# Patient Record
Sex: MALE | Race: BLACK OR AFRICAN AMERICAN | NOT HISPANIC OR LATINO | Employment: FULL TIME | URBAN - METROPOLITAN AREA
[De-identification: names, ages, dates, MRNs, and addresses within clinical notes are randomized per-mention and may not be internally consistent; named-entity substitution may affect disease eponyms.]

---

## 2017-11-08 ENCOUNTER — OFFICE VISIT (OUTPATIENT)
Dept: FAMILY MEDICINE | Facility: CLINIC | Age: 27
End: 2017-11-08
Payer: COMMERCIAL

## 2017-11-08 VITALS
BODY MASS INDEX: 27.06 KG/M2 | TEMPERATURE: 98 F | OXYGEN SATURATION: 99 % | WEIGHT: 189 LBS | HEART RATE: 64 BPM | HEIGHT: 70 IN | SYSTOLIC BLOOD PRESSURE: 108 MMHG | DIASTOLIC BLOOD PRESSURE: 68 MMHG

## 2017-11-08 DIAGNOSIS — H61.22 IMPACTED CERUMEN OF LEFT EAR: Primary | ICD-10-CM

## 2017-11-08 PROCEDURE — 99203 OFFICE O/P NEW LOW 30 MIN: CPT | Mod: S$GLB,,, | Performed by: NURSE PRACTITIONER

## 2017-11-08 NOTE — PROGRESS NOTES
Subjective:       Patient ID: Garett Mohr is a 27 y.o. male.    Chief Complaint: Ear Fullness    Ear Fullness    There is pain in the left ear. This is a new problem. The current episode started in the past 7 days. The problem occurs constantly. The problem has been unchanged. There has been no fever. The pain is at a severity of 0/10. The patient is experiencing no pain. Pertinent negatives include no abdominal pain, coughing, diarrhea, ear discharge, headaches, hearing loss, neck pain, rash, rhinorrhea, sore throat or vomiting. He has tried nothing for the symptoms. There is no history of a chronic ear infection, hearing loss or a tympanostomy tube.     Review of Systems   Constitutional: Negative for chills, diaphoresis, fatigue and fever.   HENT: Negative for ear discharge, hearing loss, rhinorrhea and sore throat.         Left ear fullness   Eyes: Negative for photophobia and visual disturbance.   Respiratory: Negative for cough, chest tightness and wheezing.    Gastrointestinal: Negative for abdominal pain, diarrhea and vomiting.   Musculoskeletal: Negative for neck pain.   Skin: Negative for rash.   Neurological: Negative for headaches.       Objective:      Physical Exam   Constitutional: He is oriented to person, place, and time. He appears well-developed and well-nourished. No distress.   HENT:   Right Ear: External ear normal.   Left Ear: External ear normal.   Left ear cerumen impaction     Cardiovascular: Normal rate, regular rhythm and normal heart sounds.    Pulmonary/Chest: Effort normal and breath sounds normal.   Neurological: He is alert and oriented to person, place, and time.   Skin: Skin is warm and dry. He is not diaphoretic.   Psychiatric: He has a normal mood and affect. His behavior is normal. Judgment and thought content normal.   Vitals reviewed.      Assessment:       1. Impacted cerumen of left ear        Plan:       Impacted cerumen of left ear      Left ear soaked and flushed  cerumen removed pt tolerated well

## 2019-04-28 ENCOUNTER — HOSPITAL ENCOUNTER (EMERGENCY)
Facility: HOSPITAL | Age: 29
Discharge: HOME | End: 2019-04-28
Attending: EMERGENCY MEDICINE
Payer: COMMERCIAL

## 2019-04-28 VITALS
SYSTOLIC BLOOD PRESSURE: 138 MMHG | OXYGEN SATURATION: 97 % | HEIGHT: 69 IN | TEMPERATURE: 98 F | BODY MASS INDEX: 28.14 KG/M2 | WEIGHT: 190 LBS | HEART RATE: 74 BPM | RESPIRATION RATE: 20 BRPM | DIASTOLIC BLOOD PRESSURE: 91 MMHG

## 2019-04-28 DIAGNOSIS — S91.209A TOENAIL AVULSION, INITIAL ENCOUNTER: Primary | ICD-10-CM

## 2019-04-28 PROCEDURE — 99284 EMERGENCY DEPT VISIT MOD MDM: CPT | Mod: ER

## 2019-04-28 PROCEDURE — 96372 THER/PROPH/DIAG INJ SC/IM: CPT | Mod: ER

## 2019-04-28 PROCEDURE — 90715 TDAP VACCINE 7 YRS/> IM: CPT | Mod: ER | Performed by: PHYSICIAN ASSISTANT

## 2019-04-28 PROCEDURE — 63600175 PHARM REV CODE 636 W HCPCS: Mod: ER | Performed by: PHYSICIAN ASSISTANT

## 2019-04-28 PROCEDURE — 90471 IMMUNIZATION ADMIN: CPT | Mod: ER | Performed by: PHYSICIAN ASSISTANT

## 2019-04-28 PROCEDURE — 25000003 PHARM REV CODE 250: Mod: ER | Performed by: PHYSICIAN ASSISTANT

## 2019-04-28 RX ORDER — KETOROLAC TROMETHAMINE 30 MG/ML
30 INJECTION, SOLUTION INTRAMUSCULAR; INTRAVENOUS
Status: COMPLETED | OUTPATIENT
Start: 2019-04-28 | End: 2019-04-28

## 2019-04-28 RX ORDER — CEPHALEXIN 250 MG/1
250 CAPSULE ORAL 4 TIMES DAILY
Qty: 28 CAPSULE | Refills: 0 | Status: SHIPPED | OUTPATIENT
Start: 2019-04-28 | End: 2019-05-05

## 2019-04-28 RX ORDER — KETOROLAC TROMETHAMINE 10 MG/1
10 TABLET, FILM COATED ORAL EVERY 6 HOURS
Qty: 10 TABLET | Refills: 0 | Status: SHIPPED | OUTPATIENT
Start: 2019-04-28 | End: 2021-07-30

## 2019-04-28 RX ORDER — CEPHALEXIN 500 MG/1
500 CAPSULE ORAL
Status: COMPLETED | OUTPATIENT
Start: 2019-04-28 | End: 2019-04-28

## 2019-04-28 RX ADMIN — CLOSTRIDIUM TETANI TOXOID ANTIGEN (FORMALDEHYDE INACTIVATED), CORYNEBACTERIUM DIPHTHERIAE TOXOID ANTIGEN (FORMALDEHYDE INACTIVATED), BORDETELLA PERTUSSIS TOXOID ANTIGEN (GLUTARALDEHYDE INACTIVATED), BORDETELLA PERTUSSIS FILAMENTOUS HEMAGGLUTININ ANTIGEN (FORMALDEHYDE INACTIVATED), BORDETELLA PERTUSSIS PERTACTIN ANTIGEN, AND BORDETELLA PERTUSSIS FIMBRIAE 2/3 ANTIGEN 0.5 ML: 5; 2; 2.5; 5; 3; 5 INJECTION, SUSPENSION INTRAMUSCULAR at 09:04

## 2019-04-28 RX ADMIN — BACITRACIN, NEOMYCIN, POLYMYXIN B 1 EACH: 400; 3.5; 5 OINTMENT TOPICAL at 09:04

## 2019-04-28 RX ADMIN — CEPHALEXIN 500 MG: 500 CAPSULE ORAL at 10:04

## 2019-04-28 RX ADMIN — KETOROLAC TROMETHAMINE 30 MG: 30 INJECTION, SOLUTION INTRAMUSCULAR at 09:04

## 2019-04-29 NOTE — ED PROVIDER NOTES
Encounter Date: 4/28/2019       History     Chief Complaint   Patient presents with    Toe Pain     PT right toe nail was lifting and patient was playing basketball this evening and someone stepped on toe nail and pt wants to get toe nail removed. Mild swelling and redness to right great toe, No bleeding.      28-year-old male presents to the emergency department for evaluation of right great toenail avulsion status post injury. He reports that he was playing basketball this evening when another player accidentally stepped on his foot, tearing the top part of his nail.  He reports that the toenail is only attached by one side.  He reports denies any bleeding when the injury occurred.  He denies any numbness, tingling, weakness or swelling to the lower extremities.  He reports a achy, throbbing pain in his toe without radiation to the foot, ankle or knee.  No treatment was attempted prior to arrival.  He is unsure of the date of his last tetanus immunization.        Review of patient's allergies indicates:  No Known Allergies  History reviewed. No pertinent past medical history.  History reviewed. No pertinent surgical history.  History reviewed. No pertinent family history.  Social History     Tobacco Use    Smoking status: Never Smoker   Substance Use Topics    Alcohol use: No    Drug use: No     Review of Systems   Constitutional: Negative for activity change, appetite change and fever.   HENT: Negative for congestion, ear pain, facial swelling, rhinorrhea, sore throat, trouble swallowing and voice change.    Eyes: Negative for photophobia and visual disturbance.   Respiratory: Negative for cough, chest tightness, shortness of breath and wheezing.    Cardiovascular: Negative for chest pain.   Gastrointestinal: Negative for abdominal pain, constipation, diarrhea, nausea and vomiting.   Genitourinary: Negative for decreased urine volume and dysuria.   Musculoskeletal: Negative for back pain, joint swelling, neck  pain and neck stiffness.   Skin: Positive for wound. Negative for rash.   Neurological: Negative for dizziness, syncope, weakness, light-headedness, numbness and headaches.   Hematological: Does not bruise/bleed easily.       Physical Exam     Initial Vitals [04/28/19 2135]   BP Pulse Resp Temp SpO2   (!) 138/91 74 20 98.3 °F (36.8 °C) 97 %      MAP       --         Physical Exam    Nursing note and vitals reviewed.  Constitutional: He appears well-developed and well-nourished. He is not diaphoretic. No distress.   HENT:   Head: Normocephalic and atraumatic.   Right Ear: External ear normal.   Left Ear: External ear normal.   Mouth/Throat: Oropharynx is clear and moist. No oropharyngeal exudate.   Eyes: Conjunctivae and EOM are normal. Pupils are equal, round, and reactive to light.   Neck: Normal range of motion. Neck supple.   Cardiovascular: Normal rate, regular rhythm and normal heart sounds.   Pulmonary/Chest: Breath sounds normal. No respiratory distress. He has no wheezes. He has no rhonchi. He has no rales. He exhibits no tenderness.   Abdominal: Soft. Bowel sounds are normal. He exhibits no distension. There is no tenderness. There is no guarding.   Musculoskeletal:        Right knee: He exhibits normal range of motion, no swelling and no effusion. No tenderness found.        Right ankle: He exhibits normal range of motion, no swelling, no ecchymosis and no deformity. No tenderness.        Right lower leg: He exhibits no tenderness, no bony tenderness and no swelling.        Right foot: There is tenderness. There is normal range of motion, no bony tenderness and no swelling.        Feet:    Lymphadenopathy:     He has no cervical adenopathy.   Neurological: He is alert and oriented to person, place, and time.   Skin: Skin is warm and dry.   Psychiatric: He has a normal mood and affect.         ED Course   Procedures  Labs Reviewed - No data to display       Imaging Results    None          Medical Decision  Making:   Initial Assessment:   28-year-old male presents for evaluation of partial toenail avulsion.  Physical exam reveals a nontoxic-appearing male in no acute distress. Patient is afebrile vital signs within normal limits.  Neurological exam reveals an alert and oriented patient.  No evidence of head injury noted. No tenderness to palpation noted over the paraspinal musculature of the spinous processes of the cervical, thoracic or lumbar spine.  Lungs clear to auscultation bilaterally. No respiratory distress or accessory muscle use noted.  Examination of the right lower extremity reveals Partial nail avulsion of the top 2/3 of the right great toenail.  No active bleeding noted. The avulsed toenail is still attached to the medial side of the nail bed.  Full range of motion, sensation and peripheral pulses intact in lower extremities bilaterally.  Differential Diagnosis:   Partial nail avulsion  Offered an x-ray to assess for possible osseous injury including fracture dislocation, patient declined at this time stating he does not believe it to be fractured.  ED Management:  Patient's tetanus immunization is brought up-to-date.  Will cover the patient with Keflex upon discharge. Instructed the patient to follow up with his primary care provider for re-evaluation and to return to the emergency department immediately for any new or worsening symptoms.                      Clinical Impression:       ICD-10-CM ICD-9-CM   1. Toenail avulsion, initial encounter S91.209A 893.0                                Tata Gamble PA-C  04/29/19 0006

## 2019-04-29 NOTE — DISCHARGE INSTRUCTIONS
You are advised to follow up with your primary care provider for re-evaluation within 3 days.  You are instructed to return to the emergency department immediately for any new or worsening symptoms

## 2021-07-30 ENCOUNTER — OFFICE VISIT (OUTPATIENT)
Dept: FAMILY MEDICINE | Facility: CLINIC | Age: 31
End: 2021-07-30
Payer: COMMERCIAL

## 2021-07-30 ENCOUNTER — LAB VISIT (OUTPATIENT)
Dept: LAB | Facility: HOSPITAL | Age: 31
End: 2021-07-30
Attending: STUDENT IN AN ORGANIZED HEALTH CARE EDUCATION/TRAINING PROGRAM
Payer: COMMERCIAL

## 2021-07-30 VITALS
SYSTOLIC BLOOD PRESSURE: 122 MMHG | BODY MASS INDEX: 31.17 KG/M2 | TEMPERATURE: 98 F | WEIGHT: 210.44 LBS | OXYGEN SATURATION: 98 % | HEART RATE: 67 BPM | DIASTOLIC BLOOD PRESSURE: 78 MMHG | HEIGHT: 69 IN

## 2021-07-30 DIAGNOSIS — R73.9 HYPERGLYCEMIA: ICD-10-CM

## 2021-07-30 DIAGNOSIS — Z00.00 WELLNESS EXAMINATION: ICD-10-CM

## 2021-07-30 DIAGNOSIS — Z00.00 WELLNESS EXAMINATION: Primary | ICD-10-CM

## 2021-07-30 DIAGNOSIS — Z13.6 SCREENING FOR CARDIOVASCULAR CONDITION: ICD-10-CM

## 2021-07-30 LAB
ALBUMIN SERPL BCP-MCNC: 4.6 G/DL (ref 3.5–5.2)
ALP SERPL-CCNC: 73 U/L (ref 38–126)
ALT SERPL W/O P-5'-P-CCNC: 26 U/L (ref 10–44)
ANION GAP SERPL CALC-SCNC: 9 MMOL/L (ref 8–16)
AST SERPL-CCNC: 42 U/L (ref 15–46)
BASOPHILS # BLD AUTO: 0.03 K/UL (ref 0–0.2)
BASOPHILS NFR BLD: 0.9 % (ref 0–1.9)
BILIRUB SERPL-MCNC: 0.6 MG/DL (ref 0.1–1)
CALCIUM SERPL-MCNC: 9.3 MG/DL (ref 8.7–10.5)
CHLORIDE SERPL-SCNC: 102 MMOL/L (ref 95–110)
CHOLEST SERPL-MCNC: 146 MG/DL (ref 120–199)
CHOLEST/HDLC SERPL: 3.2 {RATIO} (ref 2–5)
CO2 SERPL-SCNC: 30 MMOL/L (ref 23–29)
CREAT SERPL-MCNC: 1.22 MG/DL (ref 0.5–1.4)
DIFFERENTIAL METHOD: ABNORMAL
EOSINOPHIL # BLD AUTO: 0 K/UL (ref 0–0.5)
EOSINOPHIL NFR BLD: 1.2 % (ref 0–8)
ERYTHROCYTE [DISTWIDTH] IN BLOOD BY AUTOMATED COUNT: 13.5 % (ref 11.5–14.5)
EST. GFR  (AFRICAN AMERICAN): >60 ML/MIN/1.73 M^2
EST. GFR  (NON AFRICAN AMERICAN): >60 ML/MIN/1.73 M^2
ESTIMATED AVG GLUCOSE: 111 MG/DL (ref 68–131)
GLUCOSE SERPL-MCNC: 84 MG/DL (ref 70–110)
HBA1C MFR BLD: 5.5 % (ref 4–5.6)
HCT VFR BLD AUTO: 46.2 % (ref 40–54)
HDLC SERPL-MCNC: 45 MG/DL (ref 40–75)
HDLC SERPL: 30.8 % (ref 20–50)
HGB BLD-MCNC: 15.3 G/DL (ref 14–18)
IMM GRANULOCYTES # BLD AUTO: 0 K/UL (ref 0–0.04)
IMM GRANULOCYTES NFR BLD AUTO: 0 % (ref 0–0.5)
LDLC SERPL CALC-MCNC: 92.2 MG/DL (ref 63–159)
LYMPHOCYTES # BLD AUTO: 1.9 K/UL (ref 1–4.8)
LYMPHOCYTES NFR BLD: 60.4 % (ref 18–48)
MCH RBC QN AUTO: 29.4 PG (ref 27–31)
MCHC RBC AUTO-ENTMCNC: 33.1 G/DL (ref 32–36)
MCV RBC AUTO: 89 FL (ref 82–98)
MONOCYTES # BLD AUTO: 0.4 K/UL (ref 0.3–1)
MONOCYTES NFR BLD: 11.2 % (ref 4–15)
NEUTROPHILS # BLD AUTO: 0.8 K/UL (ref 1.8–7.7)
NEUTROPHILS NFR BLD: 26.3 % (ref 38–73)
NONHDLC SERPL-MCNC: 101 MG/DL
NRBC BLD-RTO: 0 /100 WBC
PLATELET # BLD AUTO: 207 K/UL (ref 150–450)
PMV BLD AUTO: 12.7 FL (ref 9.2–12.9)
POTASSIUM SERPL-SCNC: 4.3 MMOL/L (ref 3.5–5.1)
PROT SERPL-MCNC: 7.5 G/DL (ref 6–8.4)
RBC # BLD AUTO: 5.2 M/UL (ref 4.6–6.2)
SODIUM SERPL-SCNC: 141 MMOL/L (ref 136–145)
TRIGL SERPL-MCNC: 44 MG/DL (ref 30–150)
TSH SERPL DL<=0.005 MIU/L-ACNC: 0.74 UIU/ML (ref 0.4–4)
UUN UR-MCNC: 17 MG/DL (ref 2–20)
WBC # BLD AUTO: 3.21 K/UL (ref 3.9–12.7)

## 2021-07-30 PROCEDURE — 36415 COLL VENOUS BLD VENIPUNCTURE: CPT | Mod: PO | Performed by: STUDENT IN AN ORGANIZED HEALTH CARE EDUCATION/TRAINING PROGRAM

## 2021-07-30 PROCEDURE — 83036 HEMOGLOBIN GLYCOSYLATED A1C: CPT | Performed by: STUDENT IN AN ORGANIZED HEALTH CARE EDUCATION/TRAINING PROGRAM

## 2021-07-30 PROCEDURE — 80053 COMPREHEN METABOLIC PANEL: CPT | Mod: PO | Performed by: STUDENT IN AN ORGANIZED HEALTH CARE EDUCATION/TRAINING PROGRAM

## 2021-07-30 PROCEDURE — 84443 ASSAY THYROID STIM HORMONE: CPT | Mod: PO | Performed by: STUDENT IN AN ORGANIZED HEALTH CARE EDUCATION/TRAINING PROGRAM

## 2021-07-30 PROCEDURE — 99385 PREV VISIT NEW AGE 18-39: CPT | Mod: S$GLB,,, | Performed by: STUDENT IN AN ORGANIZED HEALTH CARE EDUCATION/TRAINING PROGRAM

## 2021-07-30 PROCEDURE — 80061 LIPID PANEL: CPT | Performed by: STUDENT IN AN ORGANIZED HEALTH CARE EDUCATION/TRAINING PROGRAM

## 2021-07-30 PROCEDURE — 99385 PR PREVENTIVE VISIT,NEW,18-39: ICD-10-PCS | Mod: S$GLB,,, | Performed by: STUDENT IN AN ORGANIZED HEALTH CARE EDUCATION/TRAINING PROGRAM

## 2021-07-30 PROCEDURE — 85025 COMPLETE CBC W/AUTO DIFF WBC: CPT | Mod: PO | Performed by: STUDENT IN AN ORGANIZED HEALTH CARE EDUCATION/TRAINING PROGRAM

## 2021-08-03 ENCOUNTER — TELEPHONE (OUTPATIENT)
Dept: FAMILY MEDICINE | Facility: CLINIC | Age: 31
End: 2021-08-03

## 2022-09-23 ENCOUNTER — OFFICE VISIT (OUTPATIENT)
Dept: FAMILY MEDICINE | Facility: CLINIC | Age: 32
End: 2022-09-23
Payer: COMMERCIAL

## 2022-09-23 VITALS
HEIGHT: 69 IN | DIASTOLIC BLOOD PRESSURE: 76 MMHG | WEIGHT: 189.94 LBS | OXYGEN SATURATION: 99 % | TEMPERATURE: 98 F | HEART RATE: 70 BPM | BODY MASS INDEX: 28.13 KG/M2 | SYSTOLIC BLOOD PRESSURE: 120 MMHG

## 2022-09-23 DIAGNOSIS — Z00.00 PREVENTATIVE HEALTH CARE: Primary | ICD-10-CM

## 2022-09-23 DIAGNOSIS — Z00.00 WELLNESS EXAMINATION: ICD-10-CM

## 2022-09-23 PROCEDURE — 99395 PREV VISIT EST AGE 18-39: CPT | Mod: S$GLB,,, | Performed by: STUDENT IN AN ORGANIZED HEALTH CARE EDUCATION/TRAINING PROGRAM

## 2022-09-23 PROCEDURE — 99395 PR PREVENTIVE VISIT,EST,18-39: ICD-10-PCS | Mod: S$GLB,,, | Performed by: STUDENT IN AN ORGANIZED HEALTH CARE EDUCATION/TRAINING PROGRAM

## 2022-09-23 NOTE — PROGRESS NOTES
" Patient ID: Garett Mohr is a 32 y.o. male.     Chief Complaint: Annual Exam    HPI   Patient here for annual exam. He has no complaints today. He is feeling well. He denies chest pain and shortness of breath. He denies bowel and urinary problems.     Review of Systems  Review of Systems   Constitutional:  Negative for fever.   HENT:  Negative for ear pain and sinus pain.    Eyes:  Negative for discharge.   Respiratory:  Negative for cough and shortness of breath.    Cardiovascular:  Negative for chest pain and leg swelling.   Gastrointestinal:  Negative for diarrhea, nausea and vomiting.   Genitourinary:  Negative for urgency.   Musculoskeletal:  Negative for myalgias.   Skin:  Negative for rash.   Neurological:  Negative for weakness and headaches.   Psychiatric/Behavioral:  Negative for depression.    All other systems reviewed and are negative.    Currently Medications  No current outpatient medications on file prior to visit.     No current facility-administered medications on file prior to visit.       Physical  Exam  Vitals:    09/23/22 1102   BP: 120/76   BP Location: Left arm   Patient Position: Sitting   Pulse: 70   Temp: 97.6 °F (36.4 °C)   SpO2: 99%   Weight: 86.1 kg (189 lb 14.8 oz)   Height: 5' 9" (1.753 m)      Body mass index is 28.05 kg/m².    Physical Exam  Vitals and nursing note reviewed.   Constitutional:       General: He is not in acute distress.     Appearance: He is not ill-appearing.   HENT:      Head: Normocephalic and atraumatic.      Right Ear: External ear normal.      Left Ear: External ear normal.      Nose: Nose normal.      Mouth/Throat:      Mouth: Mucous membranes are moist.   Eyes:      Extraocular Movements: Extraocular movements intact.      Conjunctiva/sclera: Conjunctivae normal.   Cardiovascular:      Rate and Rhythm: Normal rate and regular rhythm.      Pulses: Normal pulses.      Heart sounds: No murmur heard.  Pulmonary:      Effort: Pulmonary effort is normal. No " respiratory distress.      Breath sounds: No wheezing.   Abdominal:      General: There is no distension.      Palpations: Abdomen is soft. There is no mass.      Tenderness: There is no abdominal tenderness.   Musculoskeletal:         General: No swelling.      Cervical back: Normal range of motion.   Skin:     Coloration: Skin is not jaundiced.      Findings: No rash.   Neurological:      General: No focal deficit present.      Mental Status: He is alert and oriented to person, place, and time.   Psychiatric:         Mood and Affect: Mood normal.         Thought Content: Thought content normal.       Labs:    Complete Blood Count  Lab Results   Component Value Date    RBC 5.20 07/30/2021    HGB 15.3 07/30/2021    HCT 46.2 07/30/2021    MCV 89 07/30/2021    MCH 29.4 07/30/2021    MCHC 33.1 07/30/2021    RDW 13.5 07/30/2021     07/30/2021    MPV 12.7 07/30/2021    GRAN 0.8 (L) 07/30/2021    GRAN 26.3 (L) 07/30/2021    LYMPH 1.9 07/30/2021    LYMPH 60.4 (H) 07/30/2021    MONO 0.4 07/30/2021    MONO 11.2 07/30/2021    EOS 0.0 07/30/2021    BASO 0.03 07/30/2021    EOSINOPHIL 1.2 07/30/2021    BASOPHIL 0.9 07/30/2021    DIFFMETHOD Automated 07/30/2021       Comprehensive Metabolic Panel  No results found for: GLU, BUN, CREATININE, NA, K, CL, PROT, ALBUMIN, BILITOT, AST, ALKPHOS, CO2, ALT, ANIONGAP, EGFRNONAA, ESTGFRAFRICA    TSH  No results found for: TSH    Imaging:  No image results found.      Assessment/Plan:    Problem List Items Addressed This Visit    None  Visit Diagnoses       Preventative health care    -  Primary    Relevant Orders    CBC Auto Differential    Comprehensive metabolic panel    HEMOGLOBIN A1C    LIPID PANEL    TSH             Discussed how to stay healthy including: diet, exercise, refraining from smoking and discussed screening exams / tests needed for age, sex and family Hx.        Dustin Flanagan MD

## 2022-09-27 ENCOUNTER — LAB VISIT (OUTPATIENT)
Dept: LAB | Facility: HOSPITAL | Age: 32
End: 2022-09-27
Attending: STUDENT IN AN ORGANIZED HEALTH CARE EDUCATION/TRAINING PROGRAM
Payer: COMMERCIAL

## 2022-09-27 DIAGNOSIS — Z00.00 PREVENTATIVE HEALTH CARE: ICD-10-CM

## 2022-09-27 LAB
ALBUMIN SERPL BCP-MCNC: 4.7 G/DL (ref 3.5–5.2)
ALP SERPL-CCNC: 82 U/L (ref 38–126)
ALT SERPL W/O P-5'-P-CCNC: 44 U/L (ref 10–44)
ANION GAP SERPL CALC-SCNC: 9 MMOL/L (ref 8–16)
AST SERPL-CCNC: 61 U/L (ref 15–46)
BASOPHILS # BLD AUTO: 0.07 K/UL (ref 0–0.2)
BASOPHILS NFR BLD: 1.3 % (ref 0–1.9)
BILIRUB SERPL-MCNC: 0.9 MG/DL (ref 0.1–1)
CALCIUM SERPL-MCNC: 9.1 MG/DL (ref 8.7–10.5)
CHLORIDE SERPL-SCNC: 105 MMOL/L (ref 95–110)
CHOLEST SERPL-MCNC: 151 MG/DL (ref 120–199)
CHOLEST/HDLC SERPL: 2.8 {RATIO} (ref 2–5)
CO2 SERPL-SCNC: 27 MMOL/L (ref 23–29)
CREAT SERPL-MCNC: 1.13 MG/DL (ref 0.5–1.4)
DIFFERENTIAL METHOD: NORMAL
EOSINOPHIL # BLD AUTO: 0.1 K/UL (ref 0–0.5)
EOSINOPHIL NFR BLD: 1.7 % (ref 0–8)
ERYTHROCYTE [DISTWIDTH] IN BLOOD BY AUTOMATED COUNT: 13.6 % (ref 11.5–14.5)
EST. GFR  (NO RACE VARIABLE): >60 ML/MIN/1.73 M^2
ESTIMATED AVG GLUCOSE: 108 MG/DL (ref 68–131)
GLUCOSE SERPL-MCNC: 87 MG/DL (ref 70–110)
HBA1C MFR BLD: 5.4 % (ref 4–5.6)
HCT VFR BLD AUTO: 43.4 % (ref 40–54)
HDLC SERPL-MCNC: 54 MG/DL (ref 40–75)
HDLC SERPL: 35.8 % (ref 20–50)
HGB BLD-MCNC: 14.6 G/DL (ref 14–18)
IMM GRANULOCYTES # BLD AUTO: 0 K/UL (ref 0–0.04)
IMM GRANULOCYTES NFR BLD AUTO: 0 % (ref 0–0.5)
LDLC SERPL CALC-MCNC: 88 MG/DL (ref 63–159)
LYMPHOCYTES # BLD AUTO: 2.5 K/UL (ref 1–4.8)
LYMPHOCYTES NFR BLD: 46.4 % (ref 18–48)
MCH RBC QN AUTO: 30 PG (ref 27–31)
MCHC RBC AUTO-ENTMCNC: 33.6 G/DL (ref 32–36)
MCV RBC AUTO: 89 FL (ref 82–98)
MONOCYTES # BLD AUTO: 0.4 K/UL (ref 0.3–1)
MONOCYTES NFR BLD: 7 % (ref 4–15)
NEUTROPHILS # BLD AUTO: 2.3 K/UL (ref 1.8–7.7)
NEUTROPHILS NFR BLD: 43.6 % (ref 38–73)
NONHDLC SERPL-MCNC: 97 MG/DL
NRBC BLD-RTO: 0 /100 WBC
PLATELET # BLD AUTO: 268 K/UL (ref 150–450)
PMV BLD AUTO: 11.6 FL (ref 9.2–12.9)
POTASSIUM SERPL-SCNC: 4.9 MMOL/L (ref 3.5–5.1)
PROT SERPL-MCNC: 7.7 G/DL (ref 6–8.4)
RBC # BLD AUTO: 4.86 M/UL (ref 4.6–6.2)
SODIUM SERPL-SCNC: 141 MMOL/L (ref 136–145)
TRIGL SERPL-MCNC: 45 MG/DL (ref 30–150)
TSH SERPL DL<=0.005 MIU/L-ACNC: 0.68 UIU/ML (ref 0.4–4)
UUN UR-MCNC: 14 MG/DL (ref 2–20)
WBC # BLD AUTO: 5.3 K/UL (ref 3.9–12.7)

## 2022-09-27 PROCEDURE — 80061 LIPID PANEL: CPT | Performed by: STUDENT IN AN ORGANIZED HEALTH CARE EDUCATION/TRAINING PROGRAM

## 2022-09-27 PROCEDURE — 80053 COMPREHEN METABOLIC PANEL: CPT | Mod: PO | Performed by: STUDENT IN AN ORGANIZED HEALTH CARE EDUCATION/TRAINING PROGRAM

## 2022-09-27 PROCEDURE — 84443 ASSAY THYROID STIM HORMONE: CPT | Mod: PO | Performed by: STUDENT IN AN ORGANIZED HEALTH CARE EDUCATION/TRAINING PROGRAM

## 2022-09-27 PROCEDURE — 83036 HEMOGLOBIN GLYCOSYLATED A1C: CPT | Performed by: STUDENT IN AN ORGANIZED HEALTH CARE EDUCATION/TRAINING PROGRAM

## 2022-09-27 PROCEDURE — 85025 COMPLETE CBC W/AUTO DIFF WBC: CPT | Mod: PO | Performed by: STUDENT IN AN ORGANIZED HEALTH CARE EDUCATION/TRAINING PROGRAM

## 2022-09-27 PROCEDURE — 36415 COLL VENOUS BLD VENIPUNCTURE: CPT | Mod: PO | Performed by: STUDENT IN AN ORGANIZED HEALTH CARE EDUCATION/TRAINING PROGRAM

## 2023-05-04 ENCOUNTER — PATIENT MESSAGE (OUTPATIENT)
Dept: FAMILY MEDICINE | Facility: CLINIC | Age: 33
End: 2023-05-04
Payer: COMMERCIAL

## 2023-05-05 ENCOUNTER — PATIENT MESSAGE (OUTPATIENT)
Dept: FAMILY MEDICINE | Facility: CLINIC | Age: 33
End: 2023-05-05
Payer: COMMERCIAL

## 2023-05-05 RX ORDER — AZITHROMYCIN 250 MG/1
TABLET, FILM COATED ORAL
Qty: 6 TABLET | Refills: 0 | Status: SHIPPED | OUTPATIENT
Start: 2023-05-05 | End: 2023-05-10

## 2023-06-29 ENCOUNTER — TELEPHONE (OUTPATIENT)
Dept: ADMINISTRATIVE | Facility: OTHER | Age: 33
End: 2023-06-29
Payer: COMMERCIAL

## 2023-06-29 NOTE — TELEPHONE ENCOUNTER
----- Message from Danie Fair sent at 6/29/2023  8:33 AM CDT -----  Contact: pt wife    Type:  Sooner Apoointment Request    Caller is requesting a sooner appointment.  Caller declined first available appointment listed below.  Caller will not accept being placed on the waitlist and is requesting a message be sent to doctor.  Name of Caller:pt   When is the first available appointment?pt wife   Symptoms:rash/hives/skin irritation   Would the patient rather a call back or a response via [facility]? call  Best Call Back Number:[phone]  Additional Information: Spoke to pt's wife. Wife states pt has a rash with whelps (legs,back,sides)- no burning or pain but a lot of discomfort and itching. Symptoms have been going on for 1 week, pt has been taking Benadryl since it's the only thing helping with itching. Wife states sypmptoms may be due to stress/anxiety, pt has had this breakout before years ago. Wants appt for today or tomorrow.    Pharmacy- [facility]

## 2023-06-30 ENCOUNTER — OFFICE VISIT (OUTPATIENT)
Dept: FAMILY MEDICINE | Facility: CLINIC | Age: 33
End: 2023-06-30
Payer: COMMERCIAL

## 2023-06-30 VITALS
OXYGEN SATURATION: 99 % | HEIGHT: 69 IN | SYSTOLIC BLOOD PRESSURE: 126 MMHG | BODY MASS INDEX: 29.33 KG/M2 | TEMPERATURE: 98 F | HEART RATE: 74 BPM | DIASTOLIC BLOOD PRESSURE: 88 MMHG | WEIGHT: 198 LBS

## 2023-06-30 DIAGNOSIS — L50.9 HIVES: Primary | ICD-10-CM

## 2023-06-30 PROCEDURE — 99214 PR OFFICE/OUTPT VISIT, EST, LEVL IV, 30-39 MIN: ICD-10-PCS | Mod: S$GLB,,, | Performed by: STUDENT IN AN ORGANIZED HEALTH CARE EDUCATION/TRAINING PROGRAM

## 2023-06-30 PROCEDURE — 99214 OFFICE O/P EST MOD 30 MIN: CPT | Mod: S$GLB,,, | Performed by: STUDENT IN AN ORGANIZED HEALTH CARE EDUCATION/TRAINING PROGRAM

## 2023-06-30 RX ORDER — HYDROXYZINE HYDROCHLORIDE 25 MG/1
25 TABLET, FILM COATED ORAL 3 TIMES DAILY PRN
Qty: 45 TABLET | Refills: 1 | Status: SHIPPED | OUTPATIENT
Start: 2023-06-30

## 2023-06-30 RX ORDER — METHYLPREDNISOLONE 4 MG/1
TABLET ORAL
Qty: 21 EACH | Refills: 0 | Status: SHIPPED | OUTPATIENT
Start: 2023-06-30 | End: 2023-07-21

## 2023-07-21 ENCOUNTER — HOSPITAL ENCOUNTER (EMERGENCY)
Facility: HOSPITAL | Age: 33
Discharge: HOME | End: 2023-07-21
Attending: EMERGENCY MEDICINE
Payer: COMMERCIAL

## 2023-07-21 VITALS
OXYGEN SATURATION: 96 % | DIASTOLIC BLOOD PRESSURE: 77 MMHG | HEIGHT: 69 IN | SYSTOLIC BLOOD PRESSURE: 125 MMHG | WEIGHT: 195 LBS | BODY MASS INDEX: 28.88 KG/M2 | HEART RATE: 101 BPM | TEMPERATURE: 100 F | RESPIRATION RATE: 19 BRPM

## 2023-07-21 DIAGNOSIS — U07.1 LAB TEST POSITIVE FOR DETECTION OF COVID-19 VIRUS: Primary | ICD-10-CM

## 2023-07-21 LAB
INFLUENZA A, MOLECULAR: NEGATIVE
INFLUENZA B, MOLECULAR: NEGATIVE
SARS-COV-2 RDRP RESP QL NAA+PROBE: POSITIVE
SPECIMEN SOURCE: NORMAL

## 2023-07-21 PROCEDURE — U0002 COVID-19 LAB TEST NON-CDC: HCPCS | Mod: ER | Performed by: EMERGENCY MEDICINE

## 2023-07-21 PROCEDURE — 99283 EMERGENCY DEPT VISIT LOW MDM: CPT | Mod: ER

## 2023-07-21 PROCEDURE — 87502 INFLUENZA DNA AMP PROBE: CPT | Mod: ER | Performed by: EMERGENCY MEDICINE

## 2023-07-21 PROCEDURE — 25000003 PHARM REV CODE 250: Mod: ER | Performed by: EMERGENCY MEDICINE

## 2023-07-21 RX ORDER — IBUPROFEN 400 MG/1
400 TABLET ORAL
Status: COMPLETED | OUTPATIENT
Start: 2023-07-21 | End: 2023-07-21

## 2023-07-21 RX ORDER — ACETAMINOPHEN 325 MG/1
650 TABLET ORAL
Status: COMPLETED | OUTPATIENT
Start: 2023-07-21 | End: 2023-07-21

## 2023-07-21 RX ADMIN — ACETAMINOPHEN 650 MG: 325 TABLET ORAL at 06:07

## 2023-07-21 RX ADMIN — IBUPROFEN 400 MG: 400 TABLET ORAL at 06:07

## 2023-07-21 NOTE — Clinical Note
"Garett Lozaon" Onur was seen and treated in our emergency department on 7/21/2023.  He may return to work on 07/27/2023.       If you have any questions or concerns, please don't hesitate to call.      Paty Chang PA-C"

## 2023-07-21 NOTE — Clinical Note
"Garett Parkerdev Mohr was seen and treated in our emergency department on 7/21/2023.  He may return to work on 07/27/2023.       If you have any questions or concerns, please don't hesitate to call.      Adrian Goncalves, DO"

## 2023-07-21 NOTE — FIRST PROVIDER EVALUATION
Emergency Department TeleTriage Encounter Note      CHIEF COMPLAINT    Chief Complaint   Patient presents with    Fever     Fever bodyaches chills and fatigue started this afternoon. No meds taken. No cough. No gi s/s.       VITAL SIGNS   Initial Vitals [07/21/23 1840]   BP Pulse Resp Temp SpO2   138/85 (!) 112 19 (!) 103.2 °F (39.6 °C) 98 %      MAP       --            ALLERGIES    Review of patient's allergies indicates:  No Known Allergies    PROVIDER TRIAGE NOTE  Patient presents with complaint of body aches, chills, fever and fatigue that started today. No sore throat. No N/V/D.       Phy:   Constitutional: well nourished, well developed, appearing stated age, NAD   HEENT: NCAT, symmetrical lids, No obvious facial deformity.  Normal phonation. Normal Conjunctiva   Neck: NAROM   Respiratory: Normal effort.  No obvious use of accessory muscles   Musculoskeletal: Moved upper extremities well   Neuro: Alert, answers questions appropriately    Psych: appropriate mood and affect      Initial orders will be placed and care will be transferred to an alternate provider when patient is roomed for a full evaluation. Any additional orders and the final disposition will be determined by that provider.        ORDERS  Labs Reviewed   INFLUENZA A & B BY MOLECULAR   SARS-COV-2 RNA AMPLIFICATION, QUAL       ED Orders (720h ago, onward)      Start Ordered     Status Ordering Provider    07/21/23 1845 07/21/23 1841  acetaminophen tablet 650 mg  ED 1 Time         Last MAR action: Given - by POLINA SONI on 07/21/23 at 1845 JESSICA LARES    07/21/23 1845 07/21/23 1841  ibuprofen tablet 400 mg  ED 1 Time         Last MAR action: Given - by POLINA SONI on 07/21/23 at 1845 JESSICA LARES    07/21/23 1841 07/21/23 1841  COVID-19 Rapid Screening  STAT         In process JESSICA LARES    07/21/23 1841 07/21/23 1841  Influenza A & B by Molecular  STAT         In process JESSIAC LARES               Virtual Visit Note: The provider triage portion of this emergency department evaluation and documentation was performed via [employer], a HIPAA-compliant telemedicine application, in concert with a tele-presenter in the room. A face to face patient evaluation with one of my colleagues will occur once the patient is placed in an emergency department room.      DISCLAIMER: This note was prepared with [employer] voice recognition transcription software. Garbled syntax, mangled pronouns, and other bizarre constructions may be attributed to that software system.

## 2023-07-22 NOTE — ED PROVIDER NOTES
Encounter Date: 7/21/2023       History     Chief Complaint   Patient presents with    Fever     Fever bodyaches chills and fatigue started this afternoon. No meds taken. No cough. No gi s/s.     32-year-old male presents ER for evaluation of fever.  Patient reports body aches, chills and fatigue that started this afternoon.  He has not had a sore throat or runny nose.  No cough or congestion.  Denies any flank pain or UTI symptoms.  No episodes of vomiting or diarrhea.  No known sick contacts.  Did not take any medicine prior to arrival to the ER today.  Denies any chest pain or shortness of breath.  Otherwise healthy.    The history is provided by the patient.   Review of patient's allergies indicates:  No Known Allergies  No past medical history on file.  Past Surgical History:   Procedure Laterality Date    WISDOM TOOTH EXTRACTION       Family History   Problem Relation Age of Onset    Hypertension Mother     Hypertension Father      Social History     Tobacco Use    Smoking status: Never     Passive exposure: Never    Smokeless tobacco: Never   Substance Use Topics    Alcohol use: No    Drug use: No     Review of Systems   Constitutional:  Positive for fatigue and fever. Negative for chills.   Eyes:  Negative for visual disturbance.   Respiratory:  Negative for shortness of breath.    Cardiovascular:  Negative for chest pain.   Gastrointestinal:  Negative for nausea and vomiting.   Genitourinary:  Negative for dysuria and flank pain.   Musculoskeletal:  Positive for myalgias.   Skin:  Negative for rash.   Allergic/Immunologic: Negative for immunocompromised state.   Neurological:  Negative for weakness and numbness.   Hematological:  Does not bruise/bleed easily.   Psychiatric/Behavioral:  Negative for confusion.      Physical Exam     Initial Vitals [07/21/23 1840]   BP Pulse Resp Temp SpO2   138/85 (!) 112 19 (!) 103.2 °F (39.6 °C) 98 %      MAP       --         Physical Exam    Vitals  reviewed.  Constitutional: He appears well-developed and well-nourished. He is not diaphoretic. No distress.   HENT:   Head: Normocephalic and atraumatic.   Eyes: Conjunctivae and EOM are normal.   Neck: Neck supple.   Pulmonary/Chest: No respiratory distress.   Musculoskeletal:         General: Normal range of motion.      Cervical back: Neck supple.     Neurological: He is alert and oriented to person, place, and time.       ED Course   Procedures  Labs Reviewed   SARS-COV-2 RNA AMPLIFICATION, QUAL - Abnormal; Notable for the following components:       Result Value    SARS-CoV-2 RNA, Amplification, Qual Positive (*)     All other components within normal limits    Narrative:     Is the patient symptomatic?->Yes   INFLUENZA A & B BY MOLECULAR          Imaging Results    None          Medications   acetaminophen tablet 650 mg (650 mg Oral Given 7/21/23 1845)   ibuprofen tablet 400 mg (400 mg Oral Given 7/21/23 1845)           APC / Resident Notes:   Patient seen in the ER promptly upon arrival.  He is febrile 103.2 on arrival.  Tachycardic.  Physical examination is unremarkable.  Patient is able to speak in complete full sentences without difficulty.  Does not require supplemental O2 at this time.  Nontoxic appearing.  No evidence of hypoxia.  COVID test positive    Patient was given strict return precautions ED.  Stable for discharge and close follow-up this time.  Will advise to alternate between Tylenol and Motrin for fever and pain.  Over-the-counter cough and cold medication if needed.    Patient discharged with COVID-19 instructions.    CURRENT ISOLATION GUIDELINE:    If you tested positive and you have no symptoms, you must isolate for 5 days starting on the day of the positive test.    If you tested positive and have symptoms, you must isolate for 5 days starting on the day of the first symptoms,  not the day of the positive test.    After 5 days, if your symptoms have improved and you have not had fever on  day 5, you can return to the community on day 6- NO TESTING REQUIRED!     After 5 days of isolation are completed, the CDC recommends strict mask use for the first 5 days that you come out of isolation.                     Clinical Impression:   Final diagnoses:  [U07.1] Lab test positive for detection of COVID-19 virus (Primary)        ED Disposition Condition    Discharge Stable          ED Prescriptions    None       Follow-up Information       Follow up With Specialties Details Why Contact Info    Dustin Flanagan MD Internal Medicine   [address] [place] [address]  [phone]               Paty Chang PA-C  07/21/23 4001

## 2023-07-22 NOTE — ED NOTES
LOC:The patient is awake, alert and cooperative with a calm affect, patient is aware of environment and behaving in an age appropriate manor, patient recognizes caregiver and is speaking appropriately for age.    APPEARANCE: Resting comfortably, in no acute distress, the patient has clean hair, skin and nails, patient's clothing is properly fastened.    RESPIRATORY: Airway is open and patent, respirations are spontaneous, normal respiratory effort and rate noted.     MUSCULOSKELETAL: Patient moving all extremities well, no obvious deformities noted.    SKIN: The skin is warm and dry, patient has normal skin turgor and moist mucus membranes, no breakdown or brusing noted.    ABDOMEN: Soft and non tender in all four quadrants.

## 2023-07-22 NOTE — DISCHARGE INSTRUCTIONS
Your COVID test is positive.   Alternate between Tylenol and Motrin for fever and pain.  Use over-the-counter cough and cold medication.    CURRENT ISOLATION GUIDELINE:    If you tested positive and you have no symptoms, you must isolate for 5 days starting on the day of the positive test.    If you tested positive and have symptoms, you must isolate for 5 days starting on the day of the first symptoms,  not the day of the positive test.    After 5 days, if your symptoms have improved and you have not had fever on day 5, you can return to the community on day 6- NO TESTING REQUIRED!     After 5 days of isolation are completed, the CDC recommends strict mask use for the first 5 days that you come out of isolation.

## 2024-11-20 ENCOUNTER — TELEPHONE (OUTPATIENT)
Dept: FAMILY MEDICINE | Facility: CLINIC | Age: 34
End: 2024-11-20
Payer: COMMERCIAL

## 2024-11-20 DIAGNOSIS — Z00.00 WELLNESS EXAMINATION: Primary | ICD-10-CM

## 2024-11-20 NOTE — TELEPHONE ENCOUNTER
Left detailed vm for pt regarding lab orders placed.     Advised pt that he can walk into the lab to have done or call our office to schedule

## 2024-11-20 NOTE — TELEPHONE ENCOUNTER
----- Message from Alexia sent at 11/20/2024 11:37 AM CST -----  Type:  Orders    Who Called: Pt's wife   Does the patient know what this is regarding?: requesting full panel fasting labs prior to apt on 11/26   Would the patient rather a call back or a response via [facility]? Call   Best Call Back Number: [phone] wife    Additional Information:

## 2024-11-26 ENCOUNTER — LAB VISIT (OUTPATIENT)
Dept: LAB | Facility: HOSPITAL | Age: 34
End: 2024-11-26
Attending: STUDENT IN AN ORGANIZED HEALTH CARE EDUCATION/TRAINING PROGRAM
Payer: COMMERCIAL

## 2024-11-26 ENCOUNTER — OFFICE VISIT (OUTPATIENT)
Dept: FAMILY MEDICINE | Facility: CLINIC | Age: 34
End: 2024-11-26
Payer: COMMERCIAL

## 2024-11-26 VITALS
WEIGHT: 230.25 LBS | DIASTOLIC BLOOD PRESSURE: 86 MMHG | OXYGEN SATURATION: 97 % | BODY MASS INDEX: 34.1 KG/M2 | SYSTOLIC BLOOD PRESSURE: 130 MMHG | HEIGHT: 69 IN | HEART RATE: 93 BPM | TEMPERATURE: 98 F

## 2024-11-26 DIAGNOSIS — Z00.00 WELLNESS EXAMINATION: ICD-10-CM

## 2024-11-26 DIAGNOSIS — R09.81 NASAL CONGESTION: ICD-10-CM

## 2024-11-26 DIAGNOSIS — E66.811 CLASS 1 OBESITY WITHOUT SERIOUS COMORBIDITY WITH BODY MASS INDEX (BMI) OF 34.0 TO 34.9 IN ADULT, UNSPECIFIED OBESITY TYPE: ICD-10-CM

## 2024-11-26 DIAGNOSIS — Z00.00 WELLNESS EXAMINATION: Primary | ICD-10-CM

## 2024-11-26 LAB
ALBUMIN SERPL BCP-MCNC: 4.6 G/DL (ref 3.5–5.2)
ALP SERPL-CCNC: 92 U/L (ref 38–126)
ALT SERPL W/O P-5'-P-CCNC: 34 U/L (ref 10–44)
ANION GAP SERPL CALC-SCNC: 7 MMOL/L (ref 8–16)
AST SERPL-CCNC: 38 U/L (ref 15–46)
BASOPHILS # BLD AUTO: 0.05 K/UL (ref 0–0.2)
BASOPHILS NFR BLD: 0.8 % (ref 0–1.9)
BILIRUB SERPL-MCNC: 0.6 MG/DL (ref 0.1–1)
CALCIUM SERPL-MCNC: 9.4 MG/DL (ref 8.7–10.5)
CHLORIDE SERPL-SCNC: 103 MMOL/L (ref 95–110)
CHOLEST SERPL-MCNC: 187 MG/DL (ref 120–199)
CHOLEST/HDLC SERPL: 3.9 {RATIO} (ref 2–5)
CO2 SERPL-SCNC: 31 MMOL/L (ref 23–29)
CREAT SERPL-MCNC: 1.25 MG/DL (ref 0.5–1.4)
DIFFERENTIAL METHOD BLD: NORMAL
EOSINOPHIL # BLD AUTO: 0.1 K/UL (ref 0–0.5)
EOSINOPHIL NFR BLD: 1.7 % (ref 0–8)
ERYTHROCYTE [DISTWIDTH] IN BLOOD BY AUTOMATED COUNT: 12.7 % (ref 11.5–14.5)
EST. GFR  (NO RACE VARIABLE): >60 ML/MIN/1.73 M^2
ESTIMATED AVG GLUCOSE: 114 MG/DL (ref 68–131)
GLUCOSE SERPL-MCNC: 92 MG/DL (ref 70–110)
HBA1C MFR BLD: 5.6 % (ref 4–5.6)
HCT VFR BLD AUTO: 45.9 % (ref 40–54)
HDLC SERPL-MCNC: 48 MG/DL (ref 40–75)
HDLC SERPL: 25.7 % (ref 20–50)
HGB BLD-MCNC: 15.6 G/DL (ref 14–18)
IMM GRANULOCYTES # BLD AUTO: 0.01 K/UL (ref 0–0.04)
IMM GRANULOCYTES NFR BLD AUTO: 0.2 % (ref 0–0.5)
LDLC SERPL CALC-MCNC: 125.4 MG/DL (ref 63–159)
LYMPHOCYTES # BLD AUTO: 2.5 K/UL (ref 1–4.8)
LYMPHOCYTES NFR BLD: 39.3 % (ref 18–48)
MCH RBC QN AUTO: 29.4 PG (ref 27–31)
MCHC RBC AUTO-ENTMCNC: 34 G/DL (ref 32–36)
MCV RBC AUTO: 86 FL (ref 82–98)
MONOCYTES # BLD AUTO: 0.6 K/UL (ref 0.3–1)
MONOCYTES NFR BLD: 9 % (ref 4–15)
NEUTROPHILS # BLD AUTO: 3.1 K/UL (ref 1.8–7.7)
NEUTROPHILS NFR BLD: 49 % (ref 38–73)
NONHDLC SERPL-MCNC: 139 MG/DL
NRBC BLD-RTO: 0 /100 WBC
PLATELET # BLD AUTO: 234 K/UL (ref 150–450)
PMV BLD AUTO: 12.2 FL (ref 9.2–12.9)
POTASSIUM SERPL-SCNC: 4.1 MMOL/L (ref 3.5–5.1)
PROT SERPL-MCNC: 7.9 G/DL (ref 6–8.4)
RBC # BLD AUTO: 5.31 M/UL (ref 4.6–6.2)
SODIUM SERPL-SCNC: 141 MMOL/L (ref 136–145)
TRIGL SERPL-MCNC: 68 MG/DL (ref 30–150)
TSH SERPL DL<=0.005 MIU/L-ACNC: 1.01 UIU/ML (ref 0.4–4)
UUN UR-MCNC: 9 MG/DL (ref 2–20)
WBC # BLD AUTO: 6.41 K/UL (ref 3.9–12.7)

## 2024-11-26 PROCEDURE — 99395 PREV VISIT EST AGE 18-39: CPT | Mod: S$GLB,,, | Performed by: PHYSICIAN ASSISTANT

## 2024-11-26 PROCEDURE — 36415 COLL VENOUS BLD VENIPUNCTURE: CPT | Mod: PN | Performed by: STUDENT IN AN ORGANIZED HEALTH CARE EDUCATION/TRAINING PROGRAM

## 2024-11-26 PROCEDURE — 83036 HEMOGLOBIN GLYCOSYLATED A1C: CPT | Performed by: STUDENT IN AN ORGANIZED HEALTH CARE EDUCATION/TRAINING PROGRAM

## 2024-11-26 PROCEDURE — 80061 LIPID PANEL: CPT | Performed by: STUDENT IN AN ORGANIZED HEALTH CARE EDUCATION/TRAINING PROGRAM

## 2024-11-26 PROCEDURE — 84443 ASSAY THYROID STIM HORMONE: CPT | Mod: PN | Performed by: STUDENT IN AN ORGANIZED HEALTH CARE EDUCATION/TRAINING PROGRAM

## 2024-11-26 PROCEDURE — 80053 COMPREHEN METABOLIC PANEL: CPT | Mod: PN | Performed by: STUDENT IN AN ORGANIZED HEALTH CARE EDUCATION/TRAINING PROGRAM

## 2024-11-26 PROCEDURE — 85025 COMPLETE CBC W/AUTO DIFF WBC: CPT | Mod: PN | Performed by: STUDENT IN AN ORGANIZED HEALTH CARE EDUCATION/TRAINING PROGRAM

## 2024-11-26 RX ORDER — GUAIFENESIN 1200 MG/1
1200 TABLET, EXTENDED RELEASE ORAL 2 TIMES DAILY
Qty: 20 TABLET | Refills: 0 | Status: SHIPPED | OUTPATIENT
Start: 2024-11-26 | End: 2024-12-06

## 2024-11-26 RX ORDER — CETIRIZINE HYDROCHLORIDE 10 MG/1
10 TABLET ORAL DAILY
Qty: 30 TABLET | Refills: 0 | Status: SHIPPED | OUTPATIENT
Start: 2024-11-26

## 2024-11-26 NOTE — PROGRESS NOTES
" Patient ID: Garett Mohr is a 34 y.o. male.     Chief Complaint: Annual Exam    Mr. Mohr is a 34 year old male with no PMH who presents today for wellness visit. Pt had labs drawn this morning. He reports additionally that he has sinus congestion for 1 week. He denies sick contacts, sinus pain, headache, fever, cough SOB, CP, N/V/D. Reports bowels have been moving normally and he has no trouble with sleep. Patient with family history of prostate cancer and wants to be sure he has yearly check ups. Patient takes no chronic medication. He reports he has taken mucinex for the last 3 days for sinus, with temporary relief.        Review of Systems  Review of Systems   Constitutional:  Negative for fever.   HENT:  Positive for congestion. Negative for ear pain and sinus pain.    Eyes:  Negative for discharge.   Respiratory:  Negative for cough and wheezing.    Cardiovascular:  Negative for chest pain and leg swelling.   Gastrointestinal:  Negative for diarrhea, nausea and vomiting.   Genitourinary:  Negative for urgency.   Musculoskeletal:  Negative for myalgias.   Skin:  Negative for rash.   Neurological:  Negative for weakness and headaches.   Psychiatric/Behavioral:  Negative for depression.        Currently Medications  Current Outpatient Medications on File Prior to Visit   Medication Sig Dispense Refill    hydrOXYzine HCL (ATARAX) 25 MG tablet Take 1 tablet (25 mg total) by mouth 3 (three) times daily as needed for Itching or Anxiety. 45 tablet 1     No current facility-administered medications on file prior to visit.       Physical  Exam  Vitals:    11/26/24 0857 11/26/24 0910   BP: (!) 140/80 130/86   BP Location: Right arm    Patient Position: Sitting    Pulse: 93    Temp: 98.3 °F (36.8 °C)    TempSrc: Oral    SpO2: 97%    Weight: 104.5 kg (230 lb 4.3 oz)    Height: 5' 9" (1.753 m)       Body mass index is 34.01 kg/m².  Wt Readings from Last 3 Encounters:   11/26/24 104.5 kg (230 lb 4.3 oz)   07/21/23 88.5 " kg (195 lb)   06/30/23 89.8 kg (197 lb 15.6 oz)       Physical Exam  Vitals and nursing note reviewed.   Constitutional:       General: He is not in acute distress.     Appearance: He is obese. He is not ill-appearing.   HENT:      Head: Normocephalic and atraumatic.      Right Ear: External ear normal.      Left Ear: External ear normal.      Nose: Nose normal.      Mouth/Throat:      Mouth: Mucous membranes are moist.      Comments: Clear PND  Eyes:      Extraocular Movements: Extraocular movements intact.      Conjunctiva/sclera: Conjunctivae normal.   Cardiovascular:      Rate and Rhythm: Normal rate and regular rhythm.      Pulses: Normal pulses.      Heart sounds: No murmur heard.  Pulmonary:      Effort: Pulmonary effort is normal. No respiratory distress.      Breath sounds: No wheezing.   Abdominal:      General: There is no distension.      Palpations: Abdomen is soft. There is no mass.      Tenderness: There is no abdominal tenderness.   Musculoskeletal:         General: No swelling.      Cervical back: Normal range of motion.   Skin:     Coloration: Skin is not jaundiced.      Findings: No rash.   Neurological:      General: No focal deficit present.      Mental Status: He is alert and oriented to person, place, and time.   Psychiatric:         Mood and Affect: Mood normal.         Thought Content: Thought content normal.         Labs:    Complete Blood Count  Lab Results   Component Value Date    RBC 5.31 11/26/2024    HGB 15.6 11/26/2024    HCT 45.9 11/26/2024    MCV 86 11/26/2024    MCH 29.4 11/26/2024    MCHC 34.0 11/26/2024    RDW 12.7 11/26/2024     11/26/2024    MPV 12.2 11/26/2024    GRAN 3.1 11/26/2024    GRAN 49.0 11/26/2024    LYMPH 2.5 11/26/2024    LYMPH 39.3 11/26/2024    MONO 0.6 11/26/2024    MONO 9.0 11/26/2024    EOS 0.1 11/26/2024    BASO 0.05 11/26/2024    EOSINOPHIL 1.7 11/26/2024    BASOPHIL 0.8 11/26/2024    DIFFMETHOD Automated 11/26/2024       Comprehensive Metabolic  "Panel  No results found for: "GLU", "BUN", "CREATININE", "NA", "K", "CL", "PROT", "ALBUMIN", "BILITOT", "AST", "ALKPHOS", "CO2", "ALT", "ANIONGAP", "EGFRNONAA", "ESTGFRAFRICA"    TSH  No results found for: "TSH"    Imaging:  No image results found.      Assessment/Plan:    1. Wellness examination  -     Comprehensive Metabolic Panel; Future; Expected date: 11/26/2025  -     TSH; Future; Expected date: 11/26/2025  -     Lipid Panel; Future; Expected date: 11/26/2025  -     CBC Auto Differential; Future; Expected date: 11/26/2025  -     HEMOGLOBIN A1C; Future; Expected date: 11/26/2025    2. Nasal congestion  -     cetirizine (ZYRTEC) 10 MG tablet; Take 1 tablet (10 mg total) by mouth once daily.  Dispense: 30 tablet; Refill: 0  -     guaiFENesin (MUCINEX) 1,200 mg Ta12; Take 1,200 mg by mouth 2 (two) times a day. for 10 days  Dispense: 20 tablet; Refill: 0    3. Class 1 obesity without serious comorbidity with body mass index (BMI) of 34.0 to 34.9 in adult, unspecified obesity type  -     HEMOGLOBIN A1C; Future; Expected date: 11/26/2025       Assessment & Plan    Diagnosed patient with likely viral upper respiratory infection based on reported symptoms and physical exam findings  Noted fluid in both ears, likely secondary to sinus congestion  Considered antihistamine treatment to reduce sinus drainage and associated inflammation  Will continue monitoring slightly elevated blood pressure    UPPER RESPIRATORY INFECTION AND NASAL CONGESTION:  - Explained the mechanism of sinus drainage causing throat irritation and ear fluid retention.  - Started Claritin or Zyrtec daily.  - Continued plain Mucinex as currently taking.  - Started Ibuprofen as needed for inflammation.    ELEVATED BLOOD PRESSURE:  - Discussed proper technique for at-home blood pressure monitoring, emphasizing the importance of relaxation and correct arm positioning.  - Mr. Mohr to monitor blood pressure at home, ensuring proper technique (sit with feet " on ground, relax arm at heart level, avoid tensing up).  - Rechecked blood pressure after allowing patient to sit for 10 minutes.    FOLLOW-UP:  - Follow up in 1 year for annual check-up.  - Will review lab results once they become available and communicate findings.          Discussed how to stay healthy including: diet, exercise, refraining from smoking and discussed screening exams / tests needed for age, sex and family Hx.    This note was generated with the assistance of ambient listening technology. Verbal consent was obtained by the patient and accompanying visitor(s) for the recording of patient appointment to facilitate this note. I attest to having reviewed and edited the generated note for accuracy, though some syntax or spelling errors may persist. Please contact the author of this note for any clarification.       RTC 1 year    Lisette Stauffer PA-C

## 2024-11-26 NOTE — PATIENT INSTRUCTIONS
Start zyrtec once daily for nasal drainage. May take at night if it makes you drowsy  Continue to take OTC mucinex twice daily  May use OTC ibuprofen for sinus inflammation  Follow back if symptoms worsen

## 2024-12-04 ENCOUNTER — TELEPHONE (OUTPATIENT)
Dept: FAMILY MEDICINE | Facility: CLINIC | Age: 34
End: 2024-12-04
Payer: COMMERCIAL

## 2024-12-04 NOTE — TELEPHONE ENCOUNTER
----- Message from Beronica sent at 12/4/2024  9:45 AM CST -----  Regarding: call back  Contact: [phone]  Type:  Same Day Appointment Request    Caller is requesting a same day appointment.  Caller declined first available appointment listed below.    Name of Caller: PT   When is the first available appointment?  Symptoms: Sinus infection cough congestion       Not our norm but would you be willing to do a virtual appt for him?      Best Call Back Number: [phone]  Additional Information:

## 2024-12-05 NOTE — TELEPHONE ENCOUNTER
Caitie called clinic stating she has never received a called regarding her son. I informed her that medical staff did try calling her yesterday and LM. I informed her that MD would like for pt to do a at home covid test and if the results are negative MD would like to see pt virtually. Caitie refused this stating her son does not have covid it is more of a sore throat, and he was seen on 11/26/24 for the same symptoms and a nurse did not check son for covid or flu. I informed caitie per MD that pt would need to go to urgent care do to no availabilities.